# Patient Record
Sex: MALE | Race: WHITE | NOT HISPANIC OR LATINO | Employment: UNEMPLOYED | URBAN - METROPOLITAN AREA
[De-identification: names, ages, dates, MRNs, and addresses within clinical notes are randomized per-mention and may not be internally consistent; named-entity substitution may affect disease eponyms.]

---

## 2017-03-10 ENCOUNTER — TELEPHONE (OUTPATIENT)
Dept: FAMILY MEDICINE | Facility: CLINIC | Age: 77
End: 2017-03-10

## 2017-03-10 DIAGNOSIS — R39.89 URINARY PROBLEM: Primary | ICD-10-CM

## 2017-03-10 DIAGNOSIS — N28.1 RENAL CYST, ACQUIRED, RIGHT: ICD-10-CM

## 2017-03-10 NOTE — TELEPHONE ENCOUNTER
----- Message from Zaida Hensley sent at 3/10/2017 11:41 AM CST -----  Contact: Bambi cody/Dr Bee office  Patient has appointment this Monday to see Dr Bee for urinary issues & medication check. Needs referral faxed  [phone]

## 2017-07-05 DIAGNOSIS — R09.89 OTHER SYMPTOMS INVOLVING CARDIOVASCULAR SYSTEM: Primary | ICD-10-CM

## 2017-07-07 ENCOUNTER — HOSPITAL ENCOUNTER (OUTPATIENT)
Dept: RADIOLOGY | Facility: HOSPITAL | Age: 77
Discharge: HOME | End: 2017-07-07
Attending: FAMILY MEDICINE
Payer: MEDICARE

## 2017-07-07 DIAGNOSIS — R09.89 OTHER SYMPTOMS INVOLVING CARDIOVASCULAR SYSTEM: ICD-10-CM

## 2017-07-07 PROCEDURE — 93925 LOWER EXTREMITY STUDY: CPT | Mod: TC,PO

## 2017-12-07 ENCOUNTER — TELEPHONE (OUTPATIENT)
Dept: NEUROLOGY | Facility: CLINIC | Age: 77
End: 2017-12-07

## 2017-12-07 NOTE — TELEPHONE ENCOUNTER
----- Message from Julia Barragan sent at 12/7/2017 11:14 AM CST -----  Contact: pt's daughter Cristal [phone]  Pt's daughter Cristal called requesting for Dr YAHAIRA Tse/staff to call her ASAP.  Her dad is being discharged today from a psychiatric unit and they recommended for pt to be seen in neuro clinic to confirm or rule out Normal Pressure Hypercephalus, plus other symptoms as well.  Daughter Cristal is asking for High priority message to be sent.  If Dr YAHAIRA Tse cannot see him, perhaps he can recommend some one else.    Please call Ms Bee at [phone]  Melanie allen

## 2019-09-04 ENCOUNTER — OUTSIDE PLACE OF SERVICE (OUTPATIENT)
Dept: CARDIOLOGY | Facility: CLINIC | Age: 79
End: 2019-09-04
Payer: MEDICARE

## 2019-09-04 PROCEDURE — 93010 PR ELECTROCARDIOGRAM REPORT: ICD-10-PCS | Mod: ,,, | Performed by: INTERNAL MEDICINE

## 2019-09-04 PROCEDURE — 93010 ELECTROCARDIOGRAM REPORT: CPT | Mod: ,,, | Performed by: INTERNAL MEDICINE

## 2020-01-31 ENCOUNTER — OUTSIDE PLACE OF SERVICE (OUTPATIENT)
Dept: CARDIOLOGY | Facility: CLINIC | Age: 80
End: 2020-01-31
Payer: MEDICARE

## 2020-01-31 PROCEDURE — 93010 PR ELECTROCARDIOGRAM REPORT: ICD-10-PCS | Mod: GW,,, | Performed by: STUDENT IN AN ORGANIZED HEALTH CARE EDUCATION/TRAINING PROGRAM

## 2020-01-31 PROCEDURE — 93010 ELECTROCARDIOGRAM REPORT: CPT | Mod: GW,,, | Performed by: STUDENT IN AN ORGANIZED HEALTH CARE EDUCATION/TRAINING PROGRAM

## 2021-02-21 ENCOUNTER — OUTSIDE PLACE OF SERVICE (OUTPATIENT)
Dept: CARDIOLOGY | Facility: CLINIC | Age: 81
End: 2021-02-21
Payer: MEDICARE

## 2021-02-21 PROCEDURE — 93010 ELECTROCARDIOGRAM REPORT: CPT | Mod: ,,, | Performed by: INTERNAL MEDICINE

## 2021-02-21 PROCEDURE — 93010 PR ELECTROCARDIOGRAM REPORT: ICD-10-PCS | Mod: ,,, | Performed by: INTERNAL MEDICINE

## 2021-02-22 ENCOUNTER — OUTSIDE PLACE OF SERVICE (OUTPATIENT)
Dept: CARDIOLOGY | Facility: CLINIC | Age: 81
End: 2021-02-22
Payer: MEDICARE

## 2021-02-22 ENCOUNTER — CLINICAL SUPPORT (OUTPATIENT)
Dept: CARDIOLOGY | Facility: CLINIC | Age: 81
End: 2021-02-22
Attending: INTERNAL MEDICINE
Payer: MEDICARE

## 2021-02-22 ENCOUNTER — OUTSIDE PLACE OF SERVICE (OUTPATIENT)
Dept: CARDIOLOGY | Facility: CLINIC | Age: 81
End: 2021-02-22

## 2021-02-22 DIAGNOSIS — R06.00 DYSPNEA, UNSPECIFIED TYPE: ICD-10-CM

## 2021-02-22 LAB
AORTIC VALVE CUSP SEPERATION: 2 CM
AV INDEX (PROSTH): 0.94
AV MEAN GRADIENT: 3 MMHG
AV PEAK GRADIENT: 5 MMHG
AV REGURGITATION PRESSURE HALF TIME: 433 MS
AV VALVE AREA: 2.67 CM2
AV VELOCITY RATIO: 0.91
CV ECHO LV RWT: 0.4 CM
DOP CALC AO PEAK VEL: 1.1 M/S
DOP CALC AO VTI: 18.9 CM
DOP CALC LVOT AREA: 2.8 CM2
DOP CALC LVOT DIAMETER: 1.9 CM
DOP CALC LVOT PEAK VEL: 1 M/S
DOP CALC LVOT STROKE VOLUME: 50.44 CM3
DOP CALC MV VTI: 19 CM
DOP CALCLVOT PEAK VEL VTI: 17.8 CM
E WAVE DECELERATION TIME: 214 MSEC
E/A RATIO: 0.83
E/E' RATIO: 23 M/S
ECHO LV POSTERIOR WALL: 0.8 CM (ref 0.6–1.1)
FRACTIONAL SHORTENING: 35 % (ref 28–44)
INTERVENTRICULAR SEPTUM: 0.8 CM (ref 0.6–1.1)
IVC PROX: 0.7 CM
IVRT: 111 MSEC
LEFT ATRIUM SIZE: 1.5 CM
LEFT INTERNAL DIMENSION IN SYSTOLE: 2.6 CM (ref 2.1–4)
LEFT VENTRICULAR INTERNAL DIMENSION IN DIASTOLE: 4 CM (ref 3.5–6)
LEFT VENTRICULAR MASS: 93.46 G
LV LATERAL E/E' RATIO: 23 M/S
LV SEPTAL E/E' RATIO: 23 M/S
LVOT MG: 2.05 MMHG
LVOT MV: 0.66 CM/S
MV A" WAVE DURATION": 78 MSEC
MV MEAN GRADIENT: 1 MMHG
MV PEAK A VEL: 0.83 M/S
MV PEAK E VEL: 0.69 M/S
MV PEAK GRADIENT: 2 MMHG
MV STENOSIS PRESSURE HALF TIME: 60 MS
MV VALVE AREA BY CONTINUITY EQUATION: 2.65 CM2
MV VALVE AREA P 1/2 METHOD: 3.67 CM2
PISA TR MAX VEL: 1.71 M/S
RA WIDTH: 2.5 CM
RIGHT VENTRICULAR END-DIASTOLIC DIMENSION: 2.5 CM
SINUS: 3.6 CM
TDI LATERAL: 0.03 M/S
TDI SEPTAL: 0.03 M/S
TDI: 0.03 M/S
TR MAX PG: 12 MMHG
TRICUSPID ANNULAR PLANE SYSTOLIC EXCURSION: 2.1 CM

## 2021-02-22 PROCEDURE — 93010 PR ELECTROCARDIOGRAM REPORT: ICD-10-PCS | Mod: ,,, | Performed by: INTERNAL MEDICINE

## 2021-02-22 PROCEDURE — 99223 PR INITIAL HOSPITAL CARE,LEVL III: ICD-10-PCS | Mod: ,,, | Performed by: INTERNAL MEDICINE

## 2021-02-22 PROCEDURE — 93306 TTE W/DOPPLER COMPLETE: CPT | Mod: 26,,, | Performed by: INTERNAL MEDICINE

## 2021-02-22 PROCEDURE — 99223 1ST HOSP IP/OBS HIGH 75: CPT | Mod: ,,, | Performed by: INTERNAL MEDICINE

## 2021-02-22 PROCEDURE — 93306 ECHO (CUPID ONLY): ICD-10-PCS | Mod: 26,,, | Performed by: INTERNAL MEDICINE

## 2021-02-22 PROCEDURE — 93010 ELECTROCARDIOGRAM REPORT: CPT | Mod: ,,, | Performed by: INTERNAL MEDICINE

## 2021-03-03 ENCOUNTER — TELEPHONE (OUTPATIENT)
Dept: NEUROSURGERY | Facility: CLINIC | Age: 81
End: 2021-03-03

## 2021-03-04 ENCOUNTER — TELEPHONE (OUTPATIENT)
Dept: NEUROSURGERY | Facility: CLINIC | Age: 81
End: 2021-03-04